# Patient Record
Sex: MALE | HISPANIC OR LATINO | URBAN - METROPOLITAN AREA
[De-identification: names, ages, dates, MRNs, and addresses within clinical notes are randomized per-mention and may not be internally consistent; named-entity substitution may affect disease eponyms.]

---
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
## 2019-11-12 ENCOUNTER — FOLLOW UP ESTABLISHED (OUTPATIENT)
Dept: URBAN - METROPOLITAN AREA CLINIC 64 | Facility: CLINIC | Age: 19
End: 2019-11-12
Payer: COMMERCIAL

## 2019-11-12 PROCEDURE — 92014 COMPRE OPH EXAM EST PT 1/>: CPT | Performed by: OPTOMETRIST

## 2019-11-12 PROCEDURE — 92015 DETERMINE REFRACTIVE STATE: CPT | Performed by: OPTOMETRIST

## 2019-11-12 PROCEDURE — 92310 CONTACT LENS FITTING OU: CPT | Performed by: OPTOMETRIST

## 2019-11-12 ASSESSMENT — VISUAL ACUITY
OD: 20/20
OS: 20/20

## 2019-11-12 ASSESSMENT — INTRAOCULAR PRESSURE
OS: 13
OD: 12

## 2020-10-28 ENCOUNTER — FOLLOW UP ESTABLISHED (OUTPATIENT)
Dept: URBAN - METROPOLITAN AREA CLINIC 64 | Facility: CLINIC | Age: 20
End: 2020-10-28
Payer: COMMERCIAL

## 2020-10-28 PROCEDURE — 92310 CONTACT LENS FITTING OU: CPT | Performed by: OPTOMETRIST

## 2020-10-28 PROCEDURE — 92012 INTRM OPH EXAM EST PATIENT: CPT | Performed by: OPTOMETRIST

## 2020-10-28 PROCEDURE — 92015 DETERMINE REFRACTIVE STATE: CPT | Performed by: OPTOMETRIST

## 2020-10-28 ASSESSMENT — KERATOMETRY
OS: 42.30
OD: 42.38

## 2020-10-28 ASSESSMENT — INTRAOCULAR PRESSURE
OD: 14
OS: 14

## 2020-10-28 ASSESSMENT — VISUAL ACUITY
OS: 20/20
OD: 20/20

## 2021-09-13 ENCOUNTER — OFFICE VISIT (OUTPATIENT)
Dept: URBAN - METROPOLITAN AREA CLINIC 64 | Facility: CLINIC | Age: 21
End: 2021-09-13
Payer: COMMERCIAL

## 2021-09-13 DIAGNOSIS — H52.13 MYOPIA, BILATERAL: Primary | ICD-10-CM

## 2021-09-13 PROCEDURE — 92310 CONTACT LENS FITTING OU: CPT | Performed by: OPTOMETRIST

## 2021-09-13 PROCEDURE — 92014 COMPRE OPH EXAM EST PT 1/>: CPT | Performed by: OPTOMETRIST

## 2021-09-13 ASSESSMENT — KERATOMETRY
OD: 42.16
OS: 42.10

## 2021-09-13 ASSESSMENT — INTRAOCULAR PRESSURE
OS: 12
OD: 12

## 2021-09-13 ASSESSMENT — VISUAL ACUITY
OD: 20/20
OS: 20/20

## 2021-10-12 ENCOUNTER — OFFICE VISIT (OUTPATIENT)
Dept: URBAN - METROPOLITAN AREA CLINIC 64 | Facility: CLINIC | Age: 21
End: 2021-10-12
Payer: COMMERCIAL

## 2021-10-12 DIAGNOSIS — H16.042 MARGINAL CORNEAL ULCER OF LEFT EYE: Primary | ICD-10-CM

## 2021-10-12 PROCEDURE — 99214 OFFICE O/P EST MOD 30 MIN: CPT | Performed by: OPTOMETRIST

## 2021-10-12 RX ORDER — NEOMYCIN SULFATE, POLYMYXIN B SULFATE AND DEXAMETHASONE 3.5; 10000; 1 MG/ML; [USP'U]/ML; MG/ML
SUSPENSION/ DROPS OPHTHALMIC
Qty: 1 | Refills: 1 | Status: ACTIVE
Start: 2021-10-12

## 2021-10-12 ASSESSMENT — INTRAOCULAR PRESSURE
OD: 19
OS: 15

## 2021-10-12 NOTE — IMPRESSION/PLAN
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 Plan: CL related peripheral ulcer / infiltrate. D/C CL wear. Use maxitrol drops q3h while awake for 7-10 days. No CL wear until the eye feels back to normal.  RTC if not improving daily.

## 2022-09-13 ENCOUNTER — OFFICE VISIT (OUTPATIENT)
Dept: URBAN - METROPOLITAN AREA CLINIC 64 | Facility: CLINIC | Age: 22
End: 2022-09-13
Payer: COMMERCIAL

## 2022-09-13 DIAGNOSIS — H52.223 REGULAR ASTIGMATISM, BILATERAL: Primary | ICD-10-CM

## 2022-09-13 PROCEDURE — 92014 COMPRE OPH EXAM EST PT 1/>: CPT | Performed by: OPTOMETRIST

## 2022-09-13 PROCEDURE — 92310 CONTACT LENS FITTING OU: CPT | Performed by: OPTOMETRIST

## 2022-09-13 ASSESSMENT — KERATOMETRY
OD: 2012.29
OS: 41.83

## 2022-09-13 ASSESSMENT — VISUAL ACUITY
OD: 20/20
OS: 20/20

## 2022-09-13 ASSESSMENT — INTRAOCULAR PRESSURE
OD: 10
OS: 11

## 2022-09-13 NOTE — IMPRESSION/PLAN
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 Plan: Discussed diagnosis in detail with patient. New glasses  & contact Rx given today. Trials first. Recommend yearly exams.

## 2023-12-12 ENCOUNTER — OFFICE VISIT (OUTPATIENT)
Dept: URBAN - METROPOLITAN AREA CLINIC 64 | Facility: LOCATION | Age: 23
End: 2023-12-12
Payer: COMMERCIAL

## 2023-12-12 DIAGNOSIS — H52.223 REGULAR ASTIGMATISM, BILATERAL: Primary | ICD-10-CM

## 2023-12-12 PROCEDURE — 92310 CONTACT LENS FITTING OU: CPT | Performed by: OPTOMETRIST

## 2023-12-12 PROCEDURE — 92014 COMPRE OPH EXAM EST PT 1/>: CPT | Performed by: OPTOMETRIST

## 2023-12-12 ASSESSMENT — INTRAOCULAR PRESSURE
OD: 11
OS: 12

## 2023-12-12 ASSESSMENT — VISUAL ACUITY
OD: 20/20
OS: 20/20

## 2024-12-12 ENCOUNTER — OFFICE VISIT (OUTPATIENT)
Dept: URBAN - METROPOLITAN AREA CLINIC 64 | Facility: LOCATION | Age: 24
End: 2024-12-12
Payer: COMMERCIAL

## 2024-12-12 DIAGNOSIS — H52.223 REGULAR ASTIGMATISM, BILATERAL: Primary | ICD-10-CM

## 2024-12-12 PROCEDURE — 92310 CONTACT LENS FITTING OU: CPT | Performed by: OPTOMETRIST

## 2024-12-12 PROCEDURE — 92014 COMPRE OPH EXAM EST PT 1/>: CPT | Performed by: OPTOMETRIST

## 2024-12-12 ASSESSMENT — KERATOMETRY: OS: 41.70

## 2024-12-12 ASSESSMENT — INTRAOCULAR PRESSURE
OD: 14
OS: 12

## 2024-12-12 ASSESSMENT — VISUAL ACUITY
OS: 20/20
OD: 20/20